# Patient Record
Sex: FEMALE | Race: WHITE | NOT HISPANIC OR LATINO | Employment: UNEMPLOYED | URBAN - METROPOLITAN AREA
[De-identification: names, ages, dates, MRNs, and addresses within clinical notes are randomized per-mention and may not be internally consistent; named-entity substitution may affect disease eponyms.]

---

## 2017-01-03 ENCOUNTER — TELEPHONE (OUTPATIENT)
Dept: OBSTETRICS AND GYNECOLOGY | Facility: CLINIC | Age: 37
End: 2017-01-03

## 2017-01-03 NOTE — TELEPHONE ENCOUNTER
----- Message from Peg Connell sent at 1/3/2017 10:49 AM EST -----  Regarding: RX  Contact: [phone]  NEEDS MEDICATION    SAYS SHE WILL HAVE A LAPSE IN MEDS DOES NOT HAVE ENOUGH FOR TODAY       reji gave the ok to call in enough medication until patients couns. Appointment 1/9/17 at [place], [place]

## 2017-05-02 RX ORDER — ONDANSETRON HYDROCHLORIDE 8 MG/1
TABLET, FILM COATED ORAL
Qty: 12 TABLET | Refills: 2 | OUTPATIENT
Start: 2017-05-02

## 2017-05-30 ENCOUNTER — OFFICE VISIT (OUTPATIENT)
Dept: NEUROLOGY | Facility: CLINIC | Age: 37
End: 2017-05-30

## 2017-05-30 VITALS
SYSTOLIC BLOOD PRESSURE: 124 MMHG | DIASTOLIC BLOOD PRESSURE: 82 MMHG | WEIGHT: 185 LBS | HEIGHT: 65 IN | HEART RATE: 80 BPM | BODY MASS INDEX: 30.82 KG/M2 | OXYGEN SATURATION: 99 %

## 2017-05-30 DIAGNOSIS — G40.919 EPILEPSY UNDETERMINED AS TO FOCAL OR GENERALIZED, INTRACTABLE (HCC): Primary | ICD-10-CM

## 2017-05-30 PROCEDURE — 99205 OFFICE O/P NEW HI 60 MIN: CPT | Performed by: PSYCHIATRY & NEUROLOGY

## 2017-05-30 RX ORDER — LEVETIRACETAM 500 MG/1
TABLET ORAL
Refills: 0 | COMMUNITY
Start: 2017-04-16 | End: 2017-05-30 | Stop reason: SDUPTHER

## 2017-05-30 RX ORDER — LEVETIRACETAM 500 MG/1
500 TABLET ORAL 2 TIMES DAILY
Qty: 60 TABLET | Refills: 5 | Status: SHIPPED | OUTPATIENT
Start: 2017-05-30 | End: 2018-06-13 | Stop reason: SDUPTHER

## 2017-07-10 ENCOUNTER — TELEPHONE (OUTPATIENT)
Dept: NEUROLOGY | Facility: CLINIC | Age: 37
End: 2017-07-10

## 2017-07-10 NOTE — TELEPHONE ENCOUNTER
ELY CALLED AND EXPLAINED HOW [place] HAD ASKED HER TO NOT GET ON ANY NEW MEDICATION UNTIL HER NEXT APPOINTMENT (WHICH IS 10/17)    BUT TAYLA (HER PCP) WANTS TO START HER ON NALOXONE AND ELY WANTED TO KNOW IF THIS WAS OKAY BECAUSE OF HER SEIZURES.     PLEASE CALL ELY BACK  [phone]

## 2017-07-10 NOTE — TELEPHONE ENCOUNTER
1 Naloxone causes seizures in less than 1% of those who take it, unless it causes withdrawal from opioids.  2 Did she get the EEG?  3 We can increase Keppra from one 500mg tab bid to 1 1/2 bid for now, although I know it causes sleepiness. I don't see that we received records for her from past neurologists -- can you check as well in case I'm missing it?   Thanks

## 2017-07-13 ENCOUNTER — HOSPITAL ENCOUNTER (OUTPATIENT)
Dept: NEUROLOGY | Facility: HOSPITAL | Age: 37
Discharge: HOME | End: 2017-07-13
Attending: PSYCHIATRY & NEUROLOGY | Admitting: PSYCHIATRY & NEUROLOGY

## 2017-07-13 DIAGNOSIS — G40.919 EPILEPSY UNDETERMINED AS TO FOCAL OR GENERALIZED, INTRACTABLE (HCC): ICD-10-CM

## 2017-07-13 PROCEDURE — 95816 EEG AWAKE AND DROWSY: CPT

## 2018-06-14 RX ORDER — LEVETIRACETAM 500 MG/1
TABLET ORAL
Qty: 60 TABLET | Refills: 0 | Status: SHIPPED | OUTPATIENT
Start: 2018-06-14